# Patient Record
Sex: FEMALE | Race: WHITE
[De-identification: names, ages, dates, MRNs, and addresses within clinical notes are randomized per-mention and may not be internally consistent; named-entity substitution may affect disease eponyms.]

---

## 2021-09-21 ENCOUNTER — HOSPITAL ENCOUNTER (OUTPATIENT)
Dept: HOSPITAL 95 - LAB | Age: 26
End: 2021-09-21
Attending: ADVANCED PRACTICE MIDWIFE
Payer: COMMERCIAL

## 2021-09-21 DIAGNOSIS — Z34.92: Primary | ICD-10-CM

## 2021-09-21 LAB
BASOPHILS # BLD AUTO: 0.03 K/MM3 (ref 0–0.23)
BASOPHILS NFR BLD AUTO: 0 % (ref 0–2)
DEPRECATED RDW RBC AUTO: 43.9 FL (ref 35.1–46.3)
EOSINOPHIL # BLD AUTO: 0.07 K/MM3 (ref 0–0.68)
EOSINOPHIL NFR BLD AUTO: 1 % (ref 0–6)
ERYTHROCYTE [DISTWIDTH] IN BLOOD BY AUTOMATED COUNT: 12.6 % (ref 11.7–14.2)
HCT VFR BLD AUTO: 35.3 % (ref 33–51)
HGB BLD-MCNC: 12.1 G/DL (ref 11.5–16)
IMM GRANULOCYTES # BLD AUTO: 0.03 K/MM3 (ref 0–0.1)
IMM GRANULOCYTES NFR BLD AUTO: 0 % (ref 0–1)
LYMPHOCYTES # BLD AUTO: 1.54 K/MM3 (ref 0.84–5.2)
LYMPHOCYTES NFR BLD AUTO: 20 % (ref 21–46)
MCHC RBC AUTO-ENTMCNC: 34.3 G/DL (ref 31.5–36.5)
MCV RBC AUTO: 97 FL (ref 80–100)
MONOCYTES # BLD AUTO: 0.54 K/MM3 (ref 0.16–1.47)
MONOCYTES NFR BLD AUTO: 7 % (ref 4–13)
NEUTROPHILS # BLD AUTO: 5.56 K/MM3 (ref 1.96–9.15)
NEUTROPHILS NFR BLD AUTO: 72 % (ref 41–73)
NRBC # BLD AUTO: 0 K/MM3 (ref 0–0.02)
NRBC BLD AUTO-RTO: 0 /100 WBC (ref 0–0.2)
PLATELET # BLD AUTO: 213 K/MM3 (ref 150–400)

## 2021-11-09 ENCOUNTER — HOSPITAL ENCOUNTER (OUTPATIENT)
Dept: HOSPITAL 95 - LAB | Age: 26
End: 2021-11-09
Attending: ADVANCED PRACTICE MIDWIFE
Payer: COMMERCIAL

## 2021-11-09 DIAGNOSIS — Z33.1: Primary | ICD-10-CM

## 2021-11-09 LAB
HCT VFR BLD AUTO: 32.9 % (ref 33–51)
HGB BLD-MCNC: 11.4 G/DL (ref 11.5–16)

## 2022-01-05 ENCOUNTER — HOSPITAL ENCOUNTER (OUTPATIENT)
Dept: HOSPITAL 95 - LAB SHORT | Age: 27
End: 2022-01-05
Attending: ADVANCED PRACTICE MIDWIFE
Payer: COMMERCIAL

## 2022-01-05 DIAGNOSIS — Z3A.36: ICD-10-CM

## 2022-01-05 DIAGNOSIS — O91.23: Primary | ICD-10-CM

## 2022-01-17 ENCOUNTER — HOSPITAL ENCOUNTER (INPATIENT)
Dept: HOSPITAL 95 - OBS | Age: 27
LOS: 2 days | Discharge: HOME | End: 2022-01-19
Attending: ADVANCED PRACTICE MIDWIFE | Admitting: FAMILY MEDICINE
Payer: COMMERCIAL

## 2022-01-17 VITALS — WEIGHT: 138.89 LBS | HEIGHT: 62 IN | BODY MASS INDEX: 25.56 KG/M2

## 2022-01-17 DIAGNOSIS — Z53.20: ICD-10-CM

## 2022-01-17 DIAGNOSIS — Z67.40: ICD-10-CM

## 2022-01-17 DIAGNOSIS — Z20.822: ICD-10-CM

## 2022-01-17 DIAGNOSIS — F41.8: ICD-10-CM

## 2022-01-17 DIAGNOSIS — Z3A.38: ICD-10-CM

## 2022-01-17 LAB
BASOPHILS # BLD AUTO: 0.02 K/MM3 (ref 0–0.23)
BASOPHILS NFR BLD AUTO: 0 % (ref 0–2)
DEPRECATED RDW RBC AUTO: 44.4 FL (ref 35.1–46.3)
EOSINOPHIL # BLD AUTO: 0.05 K/MM3 (ref 0–0.68)
EOSINOPHIL NFR BLD AUTO: 1 % (ref 0–6)
ERYTHROCYTE [DISTWIDTH] IN BLOOD BY AUTOMATED COUNT: 12.9 % (ref 11.7–14.2)
FLUAV RNA SPEC QL NAA+PROBE: NEGATIVE
FLUBV RNA SPEC QL NAA+PROBE: NEGATIVE
HCT VFR BLD AUTO: 35.1 % (ref 33–51)
HGB BLD-MCNC: 12.4 G/DL (ref 11.5–16)
IMM GRANULOCYTES # BLD AUTO: 0.06 K/MM3 (ref 0–0.1)
IMM GRANULOCYTES NFR BLD AUTO: 1 % (ref 0–1)
LYMPHOCYTES # BLD AUTO: 1.42 K/MM3 (ref 0.84–5.2)
LYMPHOCYTES NFR BLD AUTO: 17 % (ref 21–46)
MCHC RBC AUTO-ENTMCNC: 35.3 G/DL (ref 31.5–36.5)
MCV RBC AUTO: 95 FL (ref 80–100)
MONOCYTES # BLD AUTO: 0.64 K/MM3 (ref 0.16–1.47)
MONOCYTES NFR BLD AUTO: 8 % (ref 4–13)
NEUTROPHILS # BLD AUTO: 6.02 K/MM3 (ref 1.96–9.15)
NEUTROPHILS NFR BLD AUTO: 73 % (ref 41–73)
NRBC # BLD AUTO: 0 K/MM3 (ref 0–0.02)
NRBC BLD AUTO-RTO: 0 /100 WBC (ref 0–0.2)
PLATELET # BLD AUTO: 169 K/MM3 (ref 150–400)
RSV RNA SPEC QL NAA+PROBE: NEGATIVE
SARS-COV-2 RNA RESP QL NAA+PROBE: NEGATIVE

## 2022-01-17 PROCEDURE — A9270 NON-COVERED ITEM OR SERVICE: HCPCS

## 2022-01-18 PROCEDURE — 00HU33Z INSERTION OF INFUSION DEVICE INTO SPINAL CANAL, PERCUTANEOUS APPROACH: ICD-10-PCS | Performed by: ADVANCED PRACTICE MIDWIFE

## 2022-01-18 PROCEDURE — 10907ZC DRAINAGE OF AMNIOTIC FLUID, THERAPEUTIC FROM PRODUCTS OF CONCEPTION, VIA NATURAL OR ARTIFICIAL OPENING: ICD-10-PCS | Performed by: ADVANCED PRACTICE MIDWIFE

## 2022-01-18 PROCEDURE — 3E0R3BZ INTRODUCTION OF ANESTHETIC AGENT INTO SPINAL CANAL, PERCUTANEOUS APPROACH: ICD-10-PCS | Performed by: ADVANCED PRACTICE MIDWIFE

## 2022-01-19 LAB
DEPRECATED RDW RBC AUTO: 46.3 FL (ref 35.1–46.3)
ERYTHROCYTE [DISTWIDTH] IN BLOOD BY AUTOMATED COUNT: 13.1 % (ref 11.7–14.2)
HCT VFR BLD AUTO: 30.9 % (ref 33–51)
HGB BLD-MCNC: 10.8 G/DL (ref 11.5–16)
MCHC RBC AUTO-ENTMCNC: 35 G/DL (ref 31.5–36.5)
MCV RBC AUTO: 97 FL (ref 80–100)
NRBC # BLD AUTO: 0 K/MM3 (ref 0–0.02)
NRBC BLD AUTO-RTO: 0 /100 WBC (ref 0–0.2)
PLATELET # BLD AUTO: 145 K/MM3 (ref 150–400)

## 2022-01-21 ENCOUNTER — HOSPITAL ENCOUNTER (OUTPATIENT)
Dept: HOSPITAL 95 - ORSCMMR | Age: 27
Discharge: HOME | End: 2022-01-21
Attending: ANESTHESIOLOGY
Payer: COMMERCIAL

## 2022-01-21 DIAGNOSIS — G97.1: Primary | ICD-10-CM

## 2022-01-21 PROCEDURE — 3E0S3GC INTRODUCTION OF OTHER THERAPEUTIC SUBSTANCE INTO EPIDURAL SPACE, PERCUTANEOUS APPROACH: ICD-10-PCS | Performed by: ANESTHESIOLOGY

## 2022-01-21 NOTE — NUR
DR MCDOWELL HERE TO ASSESS PT SITTING UP AFTER BLOOD PATCH AND STATES HER HA
FEELS A BIT BETTER. DR MCDOWELL GAVE ORDERS TO MONITOR PT FOR 15 MIN AND THEN MAY
DC HOME PT TO RETURN TO ER IF SIGNS OF FEVER SEVER BACK PAIN OR INFECTION PT
VERBALIZED UNDERSTANDING